# Patient Record
Sex: FEMALE | Race: OTHER
[De-identification: names, ages, dates, MRNs, and addresses within clinical notes are randomized per-mention and may not be internally consistent; named-entity substitution may affect disease eponyms.]

---

## 2022-05-23 ENCOUNTER — NON-APPOINTMENT (OUTPATIENT)
Age: 27
End: 2022-05-23

## 2022-05-24 ENCOUNTER — EMERGENCY (EMERGENCY)
Facility: HOSPITAL | Age: 27
LOS: 0 days | Discharge: HOME | End: 2022-05-24
Attending: EMERGENCY MEDICINE
Payer: COMMERCIAL

## 2022-05-24 VITALS — HEIGHT: 61 IN | WEIGHT: 115.96 LBS

## 2022-05-24 VITALS
OXYGEN SATURATION: 99 % | TEMPERATURE: 98 F | RESPIRATION RATE: 16 BRPM | DIASTOLIC BLOOD PRESSURE: 69 MMHG | SYSTOLIC BLOOD PRESSURE: 121 MMHG | HEART RATE: 73 BPM

## 2022-05-24 DIAGNOSIS — R07.89 OTHER CHEST PAIN: ICD-10-CM
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
## 2022-05-24 PROCEDURE — 71046 X-RAY EXAM CHEST 2 VIEWS: CPT

## 2022-05-24 PROCEDURE — 93010 ELECTROCARDIOGRAM REPORT: CPT

## 2022-05-24 PROCEDURE — 99284 EMERGENCY DEPT VISIT MOD MDM: CPT

## 2022-05-24 PROCEDURE — 93005 ELECTROCARDIOGRAM TRACING: CPT

## 2022-05-24 PROCEDURE — 71046 X-RAY EXAM CHEST 2 VIEWS: CPT | Mod: 26

## 2022-05-24 PROCEDURE — 99283 EMERGENCY DEPT VISIT LOW MDM: CPT | Mod: 25

## 2022-05-24 RX ORDER — IBUPROFEN 200 MG
400 TABLET ORAL ONCE
Refills: 0 | Status: COMPLETED | OUTPATIENT
Start: 2022-05-24 | End: 2022-05-24

## 2022-05-24 RX ADMIN — Medication 400 MILLIGRAM(S): at 12:20
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
## 2022-05-24 NOTE — ED STATDOCS - PHYSICAL EXAMINATION
Constitutional: NAD, well appearing  HEENT: no rhinorrhea, PERRL, no oropharyngeal erythema or exudates, midline uvula.  TMs clear.  CVS:  RRR, no m/r/g  Resp:  CTAB  GI: soft, ntnd  MSK:  no restriction to rom, full ROM to all extremities. Tenderness right parasternal area.   Neuro:  A&Ox3, 5/5 strength to all extremities,  SILT to all extremities  Skin: no rash  psych: clear thought content  Heme/lymph:  No LAD
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
## 2022-05-24 NOTE — ED STATDOCS - NS ED ROS FT
Constitutional: nad, well appearing  HEENT:  no nasal congestion, eye drainage or ear pain.    CVS:  +cp  Resp:  No sob, no cough  GI:  no abdominal pain, no nausea or vomiting  :  no dysuria  MSK: no joint pain or limited ROM  Skin: no rash  Neuro: no change in mental status or level of consciousness  Heme/lymph: no bleeding
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
## 2022-05-24 NOTE — ED STATDOCS - PATIENT PORTAL LINK FT
You can access the FollowMyHealth Patient Portal offered by [facility] by registering at the following website: [email or web address]. By joining [employer]’s FollowMyHealth portal, you will also be able to view your health information using other applications (apps) compatible with our system.
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
## 2025-04-21 ENCOUNTER — NON-APPOINTMENT (OUTPATIENT)
Age: 30
End: 2025-04-21